# Patient Record
Sex: FEMALE | Race: WHITE | NOT HISPANIC OR LATINO | URBAN - METROPOLITAN AREA
[De-identification: names, ages, dates, MRNs, and addresses within clinical notes are randomized per-mention and may not be internally consistent; named-entity substitution may affect disease eponyms.]

---

## 2017-09-26 ENCOUNTER — TELEPHONE (OUTPATIENT)
Dept: FAMILY MEDICINE CLINIC | Facility: CLINIC | Age: 62
End: 2017-09-26

## 2017-09-26 ENCOUNTER — OFFICE VISIT (OUTPATIENT)
Dept: FAMILY MEDICINE CLINIC | Facility: CLINIC | Age: 62
End: 2017-09-26

## 2017-09-26 VITALS
DIASTOLIC BLOOD PRESSURE: 78 MMHG | HEART RATE: 102 BPM | HEIGHT: 58 IN | BODY MASS INDEX: 27.27 KG/M2 | WEIGHT: 129.9 LBS | TEMPERATURE: 98.6 F | SYSTOLIC BLOOD PRESSURE: 114 MMHG | OXYGEN SATURATION: 92 %

## 2017-09-26 DIAGNOSIS — Z76.5 DRUG-SEEKING BEHAVIOR: Primary | ICD-10-CM

## 2017-09-26 DIAGNOSIS — F41.9 ANXIETY: ICD-10-CM

## 2017-09-26 PROCEDURE — 99205 OFFICE O/P NEW HI 60 MIN: CPT | Performed by: NURSE PRACTITIONER

## 2017-09-26 RX ORDER — RANITIDINE 300 MG/1
300 TABLET ORAL 2 TIMES DAILY
Refills: 3 | COMMUNITY
Start: 2017-08-29

## 2017-09-26 RX ORDER — LINACLOTIDE 145 UG/1
145 CAPSULE, GELATIN COATED ORAL DAILY
Refills: 3 | COMMUNITY
Start: 2017-08-29

## 2017-09-26 RX ORDER — AZITHROMYCIN 250 MG/1
250 TABLET, FILM COATED ORAL
COMMUNITY
Start: 2017-09-24

## 2017-09-26 RX ORDER — LISINOPRIL 10 MG/1
10 TABLET ORAL DAILY
Refills: 11 | COMMUNITY
Start: 2017-08-29

## 2017-09-26 RX ORDER — BUSPIRONE HYDROCHLORIDE 10 MG/1
10 TABLET ORAL 2 TIMES DAILY
Refills: 2 | COMMUNITY
Start: 2017-09-08 | End: 2017-09-26 | Stop reason: SDDI

## 2017-09-26 RX ORDER — HYDROXYZINE PAMOATE 50 MG/1
50 CAPSULE ORAL 2 TIMES DAILY
Refills: 3 | COMMUNITY
Start: 2017-08-29

## 2017-09-26 RX ORDER — HYDROCODONE BITARTRATE AND ACETAMINOPHEN 5; 325 MG/1; MG/1
TABLET ORAL
COMMUNITY
Start: 2017-09-24

## 2017-09-26 RX ORDER — DEXAMETHASONE 0.5 MG/1
0.5 TABLET ORAL
COMMUNITY
Start: 2017-09-24

## 2017-09-26 RX ORDER — ALBUTEROL SULFATE 90 UG/1
2 AEROSOL, METERED RESPIRATORY (INHALATION) EVERY 4 HOURS PRN
COMMUNITY

## 2017-09-26 RX ORDER — ALPRAZOLAM 0.5 MG/1
0.5 TABLET ORAL 3 TIMES DAILY
COMMUNITY
Start: 2017-09-24

## 2017-09-26 RX ORDER — PANTOPRAZOLE SODIUM 40 MG/1
40 TABLET, DELAYED RELEASE ORAL DAILY
Refills: 11 | COMMUNITY
Start: 2017-08-29

## 2017-09-26 RX ORDER — FLUOXETINE HYDROCHLORIDE 20 MG/1
20 CAPSULE ORAL DAILY
Refills: 11 | COMMUNITY
Start: 2017-08-29

## 2017-09-26 RX ORDER — SIMVASTATIN 10 MG
10 TABLET ORAL DAILY
Refills: 11 | COMMUNITY
Start: 2017-08-29

## 2017-09-26 RX ORDER — DICLOFENAC SODIUM 75 MG/1
75 TABLET, DELAYED RELEASE ORAL DAILY
COMMUNITY
Start: 2017-09-24

## 2017-09-26 RX ORDER — IPRATROPIUM BROMIDE AND ALBUTEROL SULFATE 2.5; .5 MG/3ML; MG/3ML
SOLUTION RESPIRATORY (INHALATION)
Refills: 0 | COMMUNITY
Start: 2017-07-25

## 2017-09-26 RX ORDER — CYCLOBENZAPRINE HCL 10 MG
10 TABLET ORAL 3 TIMES DAILY
Refills: 3 | COMMUNITY
Start: 2017-08-29

## 2017-09-26 RX ORDER — CETIRIZINE HYDROCHLORIDE 10 MG/1
10 TABLET ORAL DAILY
Refills: 11 | COMMUNITY
Start: 2017-08-29

## 2017-09-26 NOTE — PROGRESS NOTES
"Subjective   Deborah Marx is a 61 y.o. female who presents today for:    Hypertension (NP); Hyperlipidemia; Anxiety; Depression; Emphysema; Anemia; and Heartburn    HPI Comments: Ms. Marx presents today to establish care at this practice. She reports an extended medical history which I reviewed with her. She states her main concern today is getting a refill on xanax or ativan. States she will take either one. She is not concerned with her other medical issues is only concerned that she get something for her anxiety. She is unable to give a list of her surgical history, she wrote on her admission assessment that there is \"not enough paper\" to write them down. I have asked her what surgeries she has had and she states that she had many surgeries and does not know what they are.     I have reviewed the patient's medical history in detail and updated the computerized patient record.    Ms. Marx  reports that she has been smoking.  She has never used smokeless tobacco. She reports that she does not drink alcohol or use illicit drugs.     Allergies   Allergen Reactions   • Nsaids GI Intolerance   • Ultram [Tramadol] GI Intolerance       Current Outpatient Prescriptions:   •  albuterol (PROVENTIL HFA;VENTOLIN HFA) 108 (90 Base) MCG/ACT inhaler, Inhale 2 puffs Every 4 (Four) Hours As Needed for Wheezing., Disp: , Rfl:   •  ALPRAZolam (XANAX) 0.5 MG tablet, Take 0.5 mg by mouth 3 (Three) Times a Day., Disp: , Rfl:   •  azithromycin (ZITHROMAX) 250 MG tablet, Take 250 mg by mouth., Disp: , Rfl:   •  cetirizine (zyrTEC) 10 MG tablet, Take 10 mg by mouth Daily., Disp: , Rfl: 11  •  cyclobenzaprine (FLEXERIL) 10 MG tablet, Take 10 mg by mouth 3 (Three) Times a Day., Disp: , Rfl: 3  •  dexamethasone (DECADRON) 0.5 MG tablet, 0.5 mg., Disp: , Rfl:   •  diclofenac (VOLTAREN) 1 % gel gel, Apply 2 g onto the skin 3 (three) times daily as needed (foot pain.) for up to 15 days to affected joint(s)., Disp: , Rfl: 0  •  diclofenac " "(VOLTAREN) 75 MG EC tablet, Take 75 mg by mouth Daily., Disp: , Rfl:   •  FLUoxetine (PROzac) 20 MG capsule, Take 20 mg by mouth Daily., Disp: , Rfl: 11  •  HYDROcodone-acetaminophen (NORCO) 5-325 MG per tablet, , Disp: , Rfl:   •  hydrOXYzine (VISTARIL) 50 MG capsule, Take 50 mg by mouth 2 (Two) Times a Day., Disp: , Rfl: 3  •  ipratropium-albuterol (COMBIVENT RESPIMAT)  MCG/ACT inhaler, Inhale 1 puff 4 (Four) Times a Day As Needed for Wheezing., Disp: , Rfl:   •  ipratropium-albuterol (DUO-NEB) 0.5-2.5 mg/mL nebulizer, USE ONE VIAL VIA NEBULIZER EVERY FOUR HOURS AS NEEDED FOR FOR WHEEZING, Disp: , Rfl: 0  •  LINZESS 145 MCG capsule capsule, Take 145 mcg by mouth Daily., Disp: , Rfl: 3  •  lisinopril (PRINIVIL,ZESTRIL) 10 MG tablet, Take 10 mg by mouth Daily., Disp: , Rfl: 11  •  pantoprazole (PROTONIX) 40 MG EC tablet, Take 40 mg by mouth Daily., Disp: , Rfl: 11  •  raNITIdine (ZANTAC) 300 MG tablet, Take 300 mg by mouth 2 (Two) Times a Day., Disp: , Rfl: 3  •  simvastatin (ZOCOR) 10 MG tablet, Take 10 mg by mouth Daily., Disp: , Rfl: 11      Review of Systems   Unable to perform ROS: Other (unable to do review of systems because patient is focused on getting Xanax or Ativan)         Objective   Vitals:    09/26/17 1505   BP: 114/78   BP Location: Left arm   Patient Position: Sitting   Cuff Size: Adult   Pulse: 102   Temp: 98.6 °F (37 °C)   TempSrc: Oral   SpO2: 92%   Weight: 129 lb 14.4 oz (58.9 kg)   Height: 58\" (147.3 cm)     Physical Exam   Constitutional: She is oriented to person, place, and time. She appears well-developed and well-nourished.   Neurological: She is alert and oriented to person, place, and time.   Psychiatric: Her mood appears anxious. Her affect is angry, blunt, labile and inappropriate. Her speech is rapid and/or pressured. She is agitated, aggressive and hyperactive. Thought content is paranoid. Cognition and memory are impaired. She expresses impulsivity and inappropriate judgment. " "  Vitals reviewed.        Assessment/Plan   Deborah was seen today for hypertension, hyperlipidemia, anxiety, depression, emphysema, anemia and heartburn.    Diagnoses and all orders for this visit:    Drug-seeking behavior    Anxiety    1. I have many red flags after seeing Ms. Marx today. The first is that she is only focused on getting her xanax or ativan. The second red flag is that she told me she is doctor hopping because she can't get the medications she wants(and then told me the list of providers she has seen), and the third is that she became very angry when I had asked her sign a release of information form from her previous PCPs. She especially did not want me to get her records from Dr. Spear's office.  She also stated that she \"spends most of her time at [facility]\" seeking medical care.    2. I discussed with her that I do not prescribe xanax or ativan after meeting a patient for the first time and also I do not prescribe xanax or ativan because the patient said she was told in the hospital she needs to be on it. She became angry and states that she needs the xanax because she can't breath. I discussed with her that if her breathing is poorly controlled on the medications she is on for her COPD I will send her to see a pulmonologist. I also suggested she also see a psychiatrist to help with her anxiety. She became angry and started shouting \"I've been down that road before and I can't find anyone who will take my insurance! If you want me to see a psychiatrist you're going have to find me one!\"      3. I will review her medical records that are available through [facility]. Once I have reviewed the records I will decide if it would be appropriate to continue a patient - provider relationship.  "

## 2017-09-26 NOTE — TELEPHONE ENCOUNTER
Patient came in today to establish as a new patient with Mariela Gonzalez. I asked her to fill out a release of information form. Patient stated she did not want us to get records from Dr. Barker's office and she did not want us to request her medication records.

## 2017-09-27 ENCOUNTER — DOCUMENTATION (OUTPATIENT)
Dept: FAMILY MEDICINE CLINIC | Facility: CLINIC | Age: 62
End: 2017-09-27

## 2017-09-27 NOTE — PROGRESS NOTES
I have reviewed Ms. Marx medical records through [facility]. It has been documented by several PCPs that she is seeking Xanax, Ativan or pain medication. I will call Ms. Marx let let her know I will be happy to see her for any medical issue that does not require prescriptions for controlled substances.

## 2017-09-28 ENCOUNTER — TELEPHONE (OUTPATIENT)
Dept: FAMILY MEDICINE CLINIC | Facility: CLINIC | Age: 62
End: 2017-09-28

## 2017-09-28 NOTE — TELEPHONE ENCOUNTER
"I spoke with pt to notify her that her controlled substance appt would be cancelled per CARLY Daniel.  She asked me why and I informed her that after reviewing her previous pt records Mariela did not feel comfortable prescribing her anything controlled but would continue to see her for anything else.  She replied with, \"Tell her it was nice knowing her, \" and hung up.  "